# Patient Record
Sex: MALE | Race: WHITE | Employment: FULL TIME | URBAN - METROPOLITAN AREA
[De-identification: names, ages, dates, MRNs, and addresses within clinical notes are randomized per-mention and may not be internally consistent; named-entity substitution may affect disease eponyms.]

---

## 2024-02-19 ENCOUNTER — TELEPHONE (OUTPATIENT)
Dept: HEMATOLOGY/ONCOLOGY | Facility: HOSPITAL | Age: 29
End: 2024-02-19

## 2024-02-19 NOTE — TELEPHONE ENCOUNTER
Spoke with Shara wanted to schedule Prasanna  a appointment. I asked for  Insurance she said make appointment and I could bring card when we come for appointment . I said we can't schedule until I verify insurance. She then said I can't find card right now I said ok call us back when you have it. She then said oh wait I found it.[employer] [facility] I said ok ran it in one source and through [employer] both came back  ineligible  I explained this to Shara. she offered to pay. Thanks Jocelyn